# Patient Record
Sex: MALE | Race: WHITE | NOT HISPANIC OR LATINO | Employment: FULL TIME | URBAN - METROPOLITAN AREA
[De-identification: names, ages, dates, MRNs, and addresses within clinical notes are randomized per-mention and may not be internally consistent; named-entity substitution may affect disease eponyms.]

---

## 2021-12-18 ENCOUNTER — OFFICE VISIT (OUTPATIENT)
Dept: URGENT CARE | Facility: PHYSICIAN GROUP | Age: 31
End: 2021-12-18
Payer: COMMERCIAL

## 2021-12-18 ENCOUNTER — HOSPITAL ENCOUNTER (OUTPATIENT)
Dept: RADIOLOGY | Facility: MEDICAL CENTER | Age: 31
End: 2021-12-18
Attending: FAMILY MEDICINE
Payer: COMMERCIAL

## 2021-12-18 VITALS
WEIGHT: 205 LBS | OXYGEN SATURATION: 99 % | HEIGHT: 68 IN | RESPIRATION RATE: 16 BRPM | BODY MASS INDEX: 31.07 KG/M2 | SYSTOLIC BLOOD PRESSURE: 150 MMHG | TEMPERATURE: 97.8 F | DIASTOLIC BLOOD PRESSURE: 110 MMHG | HEART RATE: 107 BPM

## 2021-12-18 DIAGNOSIS — S93.602A SPRAIN OF LEFT FOOT, INITIAL ENCOUNTER: ICD-10-CM

## 2021-12-18 PROCEDURE — 99203 OFFICE O/P NEW LOW 30 MIN: CPT | Performed by: FAMILY MEDICINE

## 2021-12-18 PROCEDURE — 73630 X-RAY EXAM OF FOOT: CPT | Mod: LT

## 2021-12-18 NOTE — PROGRESS NOTES
"Chief Complaint   Patient presents with   • Foot Injury     Pt sts he was skiing and went over a jump (3FT) and landed wrong causing pain on left foot. Onset 2 days.          C/o constant, sharp left foot pain x 2 days, he fell while skiing.    Pain is worse with wtbearing.   Pain is located over outside of the foot.    Tylenol helps, \"a little.\"              Pertinent negatives include no  loss of motion, muscle weakness, numbness or tingling. The symptoms are aggravated by weight bearing and palpation.          Social History     Tobacco Use   • Smoking status: Former Smoker     Quit date: 2016     Years since quittin.0   • Smokeless tobacco: Never Used   Vaping Use   • Vaping Use: Never used   Substance Use Topics   • Alcohol use: Yes     Alcohol/week: 5.4 oz     Types: 9 Standard drinks or equivalent per week   • Drug use: Never         No past medical history on file.      Family hx was reviewed - no pertinent past family hx        Review of Systems   Constitutional: Negative for fever, chills and weight loss.   HENT - denies cough, ear pain, congestion, sore throat  Eyes: denies vision changes, discharge  Respiratory: Negative for cough and wheezing.    Cardiovascular: Negative for chest pain or PND.   Gastrointestinal:  No abdominal pain,  nausea, vomiting, diarrhea.  Negative for  blood in stool.    - no discharge, dysuria, frequency.      Neurological: Negative for dizziness and headaches.   musculoskeletal - denies myalgias, calf pain  Psych - denies anxiety/depression/mood changes.  Skin: no itching or rash  All other systems reviewed and are negative.           Objective:     /110 (BP Location: Left arm, Patient Position: Sitting, BP Cuff Size: Large adult)   Pulse (!) 107   Temp 36.6 °C (97.8 °F) (Temporal)   Resp 16   Ht 1.727 m (5' 8\")   Wt 93 kg (205 lb)   SpO2 99%         Physical Exam   Constitutional: pt is oriented to person, place, and time. Pt appears well-developed. No " distress.   HENT:   Head: Normocephalic and atraumatic.   Eyes: Conjunctivae are normal.   Cardiovascular: Normal rate and regular rhythm.    Pulmonary/Chest: Effort normal and breath sounds normal.   Musculoskeletal:        Left ankle: pt exhibits no edema or ecchymosis.    No medial or lateral malleolus tenderness noted.        Achilles tendon normal.        Left foot: + tender to palpation over lateral foot.    There is normal range of motion, normal capillary refill and no crepitus.   DP pulse 2+     There is no bruising or swelling or edema.   vikash's sx negative.   Neurological: pt is alert and oriented to person, place, and time. No cranial nerve deficit.   Skin: Skin is warm. Pt is not diaphoretic. No erythema.   Psychiatric: His behavior is normal.   Nursing note and vitals reviewed.      Assessment & Plan       1. Sprain of left foot, initial encounter   X-rays were personally reviewed by myself.   There is no fracture     - diclofenac sodium (VOLTAREN) 1 % Gel; Apply 4 g topically every 8 hours as needed.  Dispense: 100 g; Refill: 0  - Referral to Orthopedics